# Patient Record
Sex: FEMALE | Race: WHITE | NOT HISPANIC OR LATINO | Employment: FULL TIME | URBAN - NONMETROPOLITAN AREA
[De-identification: names, ages, dates, MRNs, and addresses within clinical notes are randomized per-mention and may not be internally consistent; named-entity substitution may affect disease eponyms.]

---

## 2023-07-31 NOTE — PROGRESS NOTES
Subjective   Patient ID: Nighat Lam is a 33 y.o. female who presents for Establish Care (New pt; lab results were done recent; was told her thyroid was high and would like to discuss; was told she had anemia as well; ).    HPI     She had blood work done recently and wanted to go over it. Had this done at [facility]. She was told that she has elevated thyroid and she is anemic. She has been told that her iron has been low in the past when going to donate blood.   TSH was 7 on recent blood work.     Depression/Anxiety: Latuda 20mg daily. She is seeing Dr. Hurley for psych. Has been on this for 2-3 months. Feeling a little less irritable on the medication.     All other systems reviewed and negative for complaint unless stated above.    PMH: Depression, Bipolar 2 unsure. Mood swings.   Surgery: none   Family: Grandma with colon and lung cancer cancer, half sister with dm.   smoker: none   Etoh: occasional, very rare one drink in the past year  Drug use: none     PAP:  Does not seen anyone. She has been to family planning once, went about 1-2 years ago. She had one at 20 years old.       Review of Systems   Constitutional:  Negative for chills and fever.   HENT:  Negative for congestion, ear pain and rhinorrhea.    Eyes:  Negative for discharge and redness.   Respiratory:  Negative for cough, shortness of breath and wheezing.    Cardiovascular:  Negative for chest pain and leg swelling.   Gastrointestinal:  Negative for abdominal pain, constipation, diarrhea, nausea and vomiting.   Genitourinary:  Negative for difficulty urinating, frequency and urgency.   Musculoskeletal:  Negative for gait problem.   Skin:  Negative for rash and wound.   Neurological:  Negative for dizziness, weakness and headaches.   Psychiatric/Behavioral:  Negative for confusion. The patient is not nervous/anxious.        Objective   /82 (BP Location: Right arm, Patient Position: Sitting, BP Cuff Size: Large adult)   Pulse 58   Ht 1.524  m (5')   Wt 75.4 kg (166 lb 3.2 oz)   BMI 32.46 kg/m²     Physical Exam  Vitals reviewed.   Constitutional:       Appearance: Normal appearance.   HENT:      Head: Normocephalic.      Right Ear: Tympanic membrane, ear canal and external ear normal.      Left Ear: Tympanic membrane, ear canal and external ear normal.      Nose: No rhinorrhea.      Mouth/Throat:      Mouth: Mucous membranes are moist.      Pharynx: Oropharynx is clear.   Eyes:      Pupils: Pupils are equal, round, and reactive to light.   Cardiovascular:      Rate and Rhythm: Normal rate and regular rhythm.      Pulses: Normal pulses.   Pulmonary:      Effort: Pulmonary effort is normal.      Breath sounds: Normal breath sounds.   Abdominal:      General: Abdomen is flat. Bowel sounds are normal.      Palpations: Abdomen is soft.   Musculoskeletal:         General: No tenderness. Normal range of motion.      Right lower leg: No edema.      Left lower leg: No edema.   Lymphadenopathy:      Cervical: No cervical adenopathy.   Skin:     General: Skin is warm and dry.      Findings: No rash.   Neurological:      Mental Status: She is alert and oriented to person, place, and time.   Psychiatric:         Mood and Affect: Mood normal.         Behavior: Behavior normal.       Assessment/Plan          #Elevated tsh   Rechecking level   May need levothyroxine  Will call when results are back     #Anemia   Checking iron   She started an iron supplement     #HCM  Reviewed cholesterol and wnl

## 2023-08-02 ENCOUNTER — OFFICE VISIT (OUTPATIENT)
Dept: PRIMARY CARE | Facility: CLINIC | Age: 33
End: 2023-08-02
Payer: COMMERCIAL

## 2023-08-02 VITALS
HEART RATE: 58 BPM | WEIGHT: 166.2 LBS | DIASTOLIC BLOOD PRESSURE: 82 MMHG | HEIGHT: 60 IN | BODY MASS INDEX: 32.63 KG/M2 | SYSTOLIC BLOOD PRESSURE: 130 MMHG

## 2023-08-02 DIAGNOSIS — R79.89 ELEVATED TSH: ICD-10-CM

## 2023-08-02 DIAGNOSIS — Z00.00 ANNUAL PHYSICAL EXAM: Primary | ICD-10-CM

## 2023-08-02 DIAGNOSIS — D64.9 ANEMIA, UNSPECIFIED TYPE: ICD-10-CM

## 2023-08-02 PROCEDURE — 1036F TOBACCO NON-USER: CPT | Performed by: REGISTERED NURSE

## 2023-08-02 PROCEDURE — 99204 OFFICE O/P NEW MOD 45 MIN: CPT | Performed by: REGISTERED NURSE

## 2023-08-02 RX ORDER — LURASIDONE HYDROCHLORIDE 20 MG/1
TABLET, FILM COATED ORAL
COMMUNITY
Start: 2023-06-20 | End: 2023-10-02 | Stop reason: ALTCHOICE

## 2023-08-02 ASSESSMENT — ENCOUNTER SYMPTOMS
DIARRHEA: 0
CONSTIPATION: 0
RHINORRHEA: 0
SHORTNESS OF BREATH: 0
DIFFICULTY URINATING: 0
ABDOMINAL PAIN: 0
COUGH: 0
CHILLS: 0
NERVOUS/ANXIOUS: 0
FEVER: 0
WHEEZING: 0
HEADACHES: 0
EYE REDNESS: 0
VOMITING: 0
NAUSEA: 0
FREQUENCY: 0
CONFUSION: 0
EYE DISCHARGE: 0
DIZZINESS: 0
WEAKNESS: 0
WOUND: 0

## 2023-08-09 ENCOUNTER — LAB (OUTPATIENT)
Dept: LAB | Facility: LAB | Age: 33
End: 2023-08-09
Payer: COMMERCIAL

## 2023-08-09 DIAGNOSIS — D64.9 ANEMIA, UNSPECIFIED TYPE: ICD-10-CM

## 2023-08-09 LAB
BASOPHILS (10*3/UL) IN BLOOD BY AUTOMATED COUNT: 0.03 X10E9/L (ref 0–0.1)
BASOPHILS/100 LEUKOCYTES IN BLOOD BY AUTOMATED COUNT: 0.3 % (ref 0–2)
EOSINOPHILS (10*3/UL) IN BLOOD BY AUTOMATED COUNT: 0.16 X10E9/L (ref 0–0.7)
EOSINOPHILS/100 LEUKOCYTES IN BLOOD BY AUTOMATED COUNT: 1.8 % (ref 0–6)
ERYTHROCYTE DISTRIBUTION WIDTH (RATIO) BY AUTOMATED COUNT: 15.9 % (ref 11.5–14.5)
ERYTHROCYTE MEAN CORPUSCULAR HEMOGLOBIN CONCENTRATION (G/DL) BY AUTOMATED: 30.2 G/DL (ref 32–36)
ERYTHROCYTE MEAN CORPUSCULAR VOLUME (FL) BY AUTOMATED COUNT: 79 FL (ref 80–100)
ERYTHROCYTES (10*6/UL) IN BLOOD BY AUTOMATED COUNT: 4.19 X10E12/L (ref 4–5.2)
FERRITIN (UG/LL) IN SER/PLAS: 9 UG/L (ref 8–150)
HEMATOCRIT (%) IN BLOOD BY AUTOMATED COUNT: 33.1 % (ref 36–46)
HEMOGLOBIN (G/DL) IN BLOOD: 10 G/DL (ref 12–16)
HYPOCHROMIA (PRESENCE) IN BLOOD BY LIGHT MICROSCOPY: NORMAL
IMMATURE GRANULOCYTES/100 LEUKOCYTES IN BLOOD BY AUTOMATED COUNT: 0.3 % (ref 0–0.9)
IRON (UG/DL) IN SER/PLAS: 13 UG/DL (ref 35–150)
IRON BINDING CAPACITY (UG/DL) IN SER/PLAS: 410 UG/DL (ref 240–445)
IRON SATURATION (%) IN SER/PLAS: 3 % (ref 25–45)
LEUKOCYTES (10*3/UL) IN BLOOD BY AUTOMATED COUNT: 8.8 X10E9/L (ref 4.4–11.3)
LYMPHOCYTES (10*3/UL) IN BLOOD BY AUTOMATED COUNT: 3.22 X10E9/L (ref 1.2–4.8)
LYMPHOCYTES/100 LEUKOCYTES IN BLOOD BY AUTOMATED COUNT: 36.8 % (ref 13–44)
MONOCYTES (10*3/UL) IN BLOOD BY AUTOMATED COUNT: 0.62 X10E9/L (ref 0.1–1)
MONOCYTES/100 LEUKOCYTES IN BLOOD BY AUTOMATED COUNT: 7.1 % (ref 2–10)
NEUTROPHILS (10*3/UL) IN BLOOD BY AUTOMATED COUNT: 4.69 X10E9/L (ref 1.2–7.7)
NEUTROPHILS/100 LEUKOCYTES IN BLOOD BY AUTOMATED COUNT: 53.7 % (ref 40–80)
OVALOCYTES PRESENCE IN BLOOD BY LIGHT MICROSCOPY: NORMAL
PLATELETS (10*3/UL) IN BLOOD AUTOMATED COUNT: 225 X10E9/L (ref 150–450)
RBC MORPHOLOGY IN BLOOD: NORMAL
THYROTROPIN (MIU/L) IN SER/PLAS BY DETECTION LIMIT <= 0.05 MIU/L: 4.13 MIU/L (ref 0.44–3.98)
THYROXINE (T4) FREE (NG/DL) IN SER/PLAS: 0.87 NG/DL (ref 0.61–1.12)

## 2023-08-09 PROCEDURE — 83550 IRON BINDING TEST: CPT

## 2023-08-09 PROCEDURE — 84443 ASSAY THYROID STIM HORMONE: CPT

## 2023-08-09 PROCEDURE — 83540 ASSAY OF IRON: CPT

## 2023-08-09 PROCEDURE — 36415 COLL VENOUS BLD VENIPUNCTURE: CPT

## 2023-08-09 PROCEDURE — 82728 ASSAY OF FERRITIN: CPT

## 2023-08-09 PROCEDURE — 82607 VITAMIN B-12: CPT

## 2023-08-09 PROCEDURE — 85025 COMPLETE CBC W/AUTO DIFF WBC: CPT

## 2023-08-09 PROCEDURE — 84439 ASSAY OF FREE THYROXINE: CPT

## 2023-08-10 LAB — COBALAMIN (VITAMIN B12) (PG/ML) IN SER/PLAS: 320 PG/ML (ref 211–911)

## 2023-08-15 DIAGNOSIS — E03.9 HYPOTHYROIDISM, UNSPECIFIED TYPE: Primary | ICD-10-CM

## 2023-08-16 RX ORDER — LEVOTHYROXINE SODIUM 25 UG/1
25 TABLET ORAL DAILY
Qty: 30 TABLET | Refills: 11 | Status: SHIPPED | OUTPATIENT
Start: 2023-08-16 | End: 2023-10-11 | Stop reason: SDUPTHER

## 2023-09-27 RX ORDER — BUSPIRONE HYDROCHLORIDE 10 MG/1
TABLET ORAL
COMMUNITY
Start: 2023-08-03 | End: 2023-10-02 | Stop reason: ALTCHOICE

## 2023-09-27 RX ORDER — HYDROXYZINE HYDROCHLORIDE 10 MG/1
TABLET, FILM COATED ORAL
COMMUNITY
Start: 2023-08-03 | End: 2024-02-05 | Stop reason: WASHOUT

## 2023-09-27 NOTE — PROGRESS NOTES
Subjective   Patient ID: Nighat Lam is a 33 y.o. female who presents for Follow-up (She is having an issue with increased anxiety. ).    HPI     She had blood work done recently and wanted to go over it. Had this done at [facility]. She was told that she has elevated thyroid and she is anemic. She has been told that her iron has been low in the past when going to donate blood. TSH was 7 on recent blood work.   She was rechecked and her TSH was 4.13, she was started on 25mcg of levothyroxine, due for recheck .      Depression/Anxiety: Latuda 20mg daily. She is seeing Dr. Hurley for psych. Has been on this for 2-3 months. Feeling a little less irritable on the medication.    She was recently increase to 40mg on her Latuda and this is helping a lot with her depression but she states since the change her anxiety has increased significantly. She is visibly shaking in office. She has Hydroxyzine 10mg for as needed use. Tried Buspirone before and had issues with this.     All other systems reviewed and negative for complaint unless stated above.     PMH: Depression, Bipolar 2 unsure. Mood swings.   Surgery: none   Family: Grandma with colon and lung cancer cancer, half sister with dm.   smoker: none   Etoh: occasional, very rare one drink in the past year  Drug use: none      PAP:  Does not seen anyone. She has been to family planning once, went about 1-2 years ago. She had one at 20 years old.       Review of Systems   Constitutional:  Negative for chills and fever.   HENT:  Negative for congestion, ear pain and rhinorrhea.    Eyes:  Negative for discharge and redness.   Respiratory:  Negative for cough, shortness of breath and wheezing.    Cardiovascular:  Negative for chest pain and leg swelling.   Gastrointestinal:  Negative for abdominal pain, constipation, diarrhea, nausea and vomiting.   Genitourinary:  Negative for difficulty urinating, frequency and urgency.   Musculoskeletal:  Negative for gait problem.    Skin:  Negative for rash and wound.   Neurological:  Negative for dizziness, weakness and headaches.   Psychiatric/Behavioral:  Negative for confusion. The patient is not nervous/anxious.        Objective   /82 (BP Location: Right arm)   Pulse 75   Ht 1.524 m (5')   Wt 75.9 kg (167 lb 6.4 oz)   BMI 32.69 kg/m²     Physical Exam  Constitutional:       Appearance: Normal appearance.   Cardiovascular:      Rate and Rhythm: Normal rate and regular rhythm.   Pulmonary:      Effort: Pulmonary effort is normal.      Breath sounds: Normal breath sounds.   Skin:     General: Skin is warm and dry.   Neurological:      Mental Status: She is alert and oriented to person, place, and time. Mental status is at baseline.   Psychiatric:         Mood and Affect: Mood normal.         Behavior: Behavior normal.         Assessment/Plan       #Elevated tsh   Started levothyroxine 25mcg   Rechecking tsh      #Anemia   Checking iron   She started an iron supplement     #Anxiety/Depression   Seeing psych, Dr. Hurley, has follow up 10/11  Increased hydroxyzine to 25mg TID   Continue latuda 40mg   She does not want to cut back at this time  Will discuss with Psych      #HCM  Reviewed cholesterol and wnl

## 2023-10-02 ENCOUNTER — OFFICE VISIT (OUTPATIENT)
Dept: PRIMARY CARE | Facility: CLINIC | Age: 33
End: 2023-10-02
Payer: COMMERCIAL

## 2023-10-02 VITALS
DIASTOLIC BLOOD PRESSURE: 82 MMHG | WEIGHT: 167.4 LBS | BODY MASS INDEX: 32.86 KG/M2 | HEIGHT: 60 IN | SYSTOLIC BLOOD PRESSURE: 122 MMHG | HEART RATE: 75 BPM

## 2023-10-02 DIAGNOSIS — F41.9 ANXIETY AND DEPRESSION: ICD-10-CM

## 2023-10-02 DIAGNOSIS — E03.9 ACQUIRED HYPOTHYROIDISM: Primary | ICD-10-CM

## 2023-10-02 DIAGNOSIS — F32.A ANXIETY AND DEPRESSION: ICD-10-CM

## 2023-10-02 PROCEDURE — 99213 OFFICE O/P EST LOW 20 MIN: CPT | Performed by: REGISTERED NURSE

## 2023-10-02 PROCEDURE — 1036F TOBACCO NON-USER: CPT | Performed by: REGISTERED NURSE

## 2023-10-02 RX ORDER — HYDROXYZINE HYDROCHLORIDE 25 MG/1
25 TABLET, FILM COATED ORAL 3 TIMES DAILY
Qty: 42 TABLET | Refills: 0 | Status: SHIPPED | OUTPATIENT
Start: 2023-10-02 | End: 2023-10-16

## 2023-10-02 RX ORDER — LURASIDONE HYDROCHLORIDE 40 MG/1
TABLET, FILM COATED ORAL
COMMUNITY
Start: 2023-08-31

## 2023-10-02 ASSESSMENT — ENCOUNTER SYMPTOMS
EYE DISCHARGE: 0
EYE REDNESS: 0
CONSTIPATION: 0
RHINORRHEA: 0
FREQUENCY: 0
NERVOUS/ANXIOUS: 0
WOUND: 0
CHILLS: 0
SHORTNESS OF BREATH: 0
WEAKNESS: 0
COUGH: 0
NAUSEA: 0
DIZZINESS: 0
VOMITING: 0
WHEEZING: 0
DIARRHEA: 0
DIFFICULTY URINATING: 0
HEADACHES: 0
ABDOMINAL PAIN: 0
FEVER: 0
CONFUSION: 0

## 2023-10-10 ENCOUNTER — LAB (OUTPATIENT)
Dept: LAB | Facility: LAB | Age: 33
End: 2023-10-10
Payer: COMMERCIAL

## 2023-10-10 DIAGNOSIS — E03.9 ACQUIRED HYPOTHYROIDISM: ICD-10-CM

## 2023-10-10 LAB
T4 FREE SERPL-MCNC: 0.86 NG/DL (ref 0.61–1.12)
TSH SERPL-ACNC: 5.67 MIU/L (ref 0.44–3.98)

## 2023-10-10 PROCEDURE — 36415 COLL VENOUS BLD VENIPUNCTURE: CPT

## 2023-10-10 PROCEDURE — 84439 ASSAY OF FREE THYROXINE: CPT

## 2023-10-10 PROCEDURE — 84443 ASSAY THYROID STIM HORMONE: CPT

## 2023-10-11 DIAGNOSIS — E03.9 HYPOTHYROIDISM, UNSPECIFIED TYPE: ICD-10-CM

## 2023-10-11 RX ORDER — LEVOTHYROXINE SODIUM 50 UG/1
50 TABLET ORAL DAILY
Qty: 30 TABLET | Refills: 11 | Status: SHIPPED | OUTPATIENT
Start: 2023-10-11 | End: 2024-10-10

## 2024-02-05 ENCOUNTER — OFFICE VISIT (OUTPATIENT)
Dept: PRIMARY CARE | Facility: CLINIC | Age: 34
End: 2024-02-05
Payer: COMMERCIAL

## 2024-02-05 VITALS
WEIGHT: 168.8 LBS | HEIGHT: 60 IN | DIASTOLIC BLOOD PRESSURE: 76 MMHG | SYSTOLIC BLOOD PRESSURE: 111 MMHG | BODY MASS INDEX: 33.14 KG/M2 | HEART RATE: 76 BPM

## 2024-02-05 DIAGNOSIS — E03.9 ACQUIRED HYPOTHYROIDISM: Primary | ICD-10-CM

## 2024-02-05 PROCEDURE — 1036F TOBACCO NON-USER: CPT | Performed by: REGISTERED NURSE

## 2024-02-05 PROCEDURE — 99213 OFFICE O/P EST LOW 20 MIN: CPT | Performed by: REGISTERED NURSE

## 2024-02-05 RX ORDER — ESCITALOPRAM OXALATE 10 MG/1
10 TABLET ORAL DAILY
COMMUNITY
Start: 2024-01-27

## 2024-02-05 ASSESSMENT — ENCOUNTER SYMPTOMS
EYE DISCHARGE: 0
CONSTIPATION: 0
ABDOMINAL PAIN: 0
NAUSEA: 0
VOMITING: 0
RHINORRHEA: 0
DIZZINESS: 0
EYE REDNESS: 0
CHILLS: 0
DIARRHEA: 0
NERVOUS/ANXIOUS: 0
SHORTNESS OF BREATH: 0
WEAKNESS: 0
DIFFICULTY URINATING: 0
WHEEZING: 0
FREQUENCY: 0
WOUND: 0
HEADACHES: 0
CONFUSION: 0
FEVER: 0
COUGH: 0

## 2024-02-05 NOTE — PROGRESS NOTES
Subjective   Patient ID: Nighat Lam is a 33 y.o. female who presents for Follow-up (Would like to get blood work to get her thyroid checked. ).    HPI   She had blood work done recently and wanted to go over it. Had this done at [facility]. She was told that she has elevated thyroid and she is anemic. She has been told that her iron has been low in the past when going to donate blood. TSH was 7 on recent blood work. She works 3rd shift.      Depression/Anxiety: Latuda 20mg daily. She is seeing Dr. Hurley for psych. Has been on this for 2-3 months. Feeling a little less irritable on the medication.    She was recently increase to 40mg on her Latuda and this is helping a lot with her depression but she states since the change her anxiety has increased significantly. She is visibly shaking in office. She has Hydroxyzine 10mg for as needed use. Tried Buspirone before and had issues with this.      All other systems reviewed and negative for complaint unless stated above.     PMH: Depression, Bipolar 2 unsure. Mood swings.   Surgery: none   Family: Grandma with colon and lung cancer cancer, half sister with dm.   smoker: none   Etoh: occasional, very rare one drink in the past year  Drug use: none      PAP:  Does not seen anyone. She has been to family planning once, went about 1-2 years ago. She had one at 20 years old.     Review of Systems   Constitutional:  Negative for chills and fever.   HENT:  Negative for congestion, ear pain and rhinorrhea.    Eyes:  Negative for discharge and redness.   Respiratory:  Negative for cough, shortness of breath and wheezing.    Cardiovascular:  Negative for chest pain and leg swelling.   Gastrointestinal:  Negative for abdominal pain, constipation, diarrhea, nausea and vomiting.   Genitourinary:  Negative for difficulty urinating, frequency and urgency.   Musculoskeletal:  Negative for gait problem.   Skin:  Negative for rash and wound.   Neurological:  Negative for dizziness,  weakness and headaches.   Psychiatric/Behavioral:  Negative for confusion. The patient is not nervous/anxious.        Objective   /76 (BP Location: Right arm)   Pulse 76   Ht 1.524 m (5')   Wt 76.6 kg (168 lb 12.8 oz)   BMI 32.97 kg/m²     Physical Exam  Constitutional:       Appearance: Normal appearance.   Cardiovascular:      Rate and Rhythm: Normal rate and regular rhythm.   Pulmonary:      Effort: Pulmonary effort is normal.      Breath sounds: Normal breath sounds.   Skin:     General: Skin is warm and dry.   Neurological:      Mental Status: She is alert and oriented to person, place, and time. Mental status is at baseline.   Psychiatric:         Mood and Affect: Mood normal.         Behavior: Behavior normal.       Assessment/Plan          #Elevated tsh   Continue Levothyroxine 50mcg   Rechecking tsh      #Anemia   Checking iron   She started an iron supplement      #Anxiety/Depression   Seeing psych, Dr. Hurley, has follow up 10/11  Increased hydroxyzine to 25mg TID   Continue latuda 40mg   She does not want to cut back at this time  Started on lexapro this is helping.   Will discuss with Psych      #HCM  Reviewed cholesterol and wnl

## 2025-04-21 ENCOUNTER — OFFICE VISIT (OUTPATIENT)
Dept: URGENT CARE | Age: 35
End: 2025-04-21
Payer: COMMERCIAL

## 2025-04-21 DIAGNOSIS — R19.7 DIARRHEA, UNSPECIFIED TYPE: Primary | ICD-10-CM

## 2025-04-21 PROCEDURE — 99202 OFFICE O/P NEW SF 15 MIN: CPT | Performed by: NURSE PRACTITIONER

## 2025-04-21 NOTE — PROGRESS NOTES
Urgent Care Virtual Video Visit    Patient Location: Home  Provider Location: [facility]    I have communicated my name and active licensure. Video visit completed with realtime synchronous video/audio connection. Informed consent was obtained from the patient. Patient was made aware that my evaluation and diagnosis are limited due to the fact that we are not in the same room during the interview and that this is a virtual encounter that took place via videoconferencing. Patient verbalized understanding.     Patient complaints of diarrhea that started at 3:00 AM. C/o umbilical pain and cramping. Complain of nausea with no vomiting. I did suggest that she go to the closest clinic for a full assessment and she declined.  States that she only needs a work note since she has diarrhea. I did do extensive dehydration teaching with her and I will provide her a work note through her e-mail. Again, she did decline to go into the closest urgent care and states if her symptoms do not improve she will go to the ER.     Patient disposition: Home    Electronically signed by ISRAEL Coulter  11:26 AM

## 2025-04-21 NOTE — LETTER
April 21, 2025     Patient: Nighat Lam   YOB: 1990   Date of Visit: 4/21/2025       To Whom It May Concern:    Nighat Lam was seen in my clinic on 4/21/2025 at 11:20 am. Please excuse Nighat for her absence from work on this day to make the appointment.    If you have any questions or concerns, please don't hesitate to call.         Sincerely,         HERSON Coulter-CNP        CC: No Recipients

## 2025-05-29 ENCOUNTER — APPOINTMENT (OUTPATIENT)
Dept: RADIOLOGY | Facility: HOSPITAL | Age: 35
End: 2025-05-29
Payer: COMMERCIAL

## 2025-05-29 ENCOUNTER — HOSPITAL ENCOUNTER (EMERGENCY)
Facility: HOSPITAL | Age: 35
Discharge: HOME | End: 2025-05-29
Attending: EMERGENCY MEDICINE
Payer: COMMERCIAL

## 2025-05-29 VITALS
DIASTOLIC BLOOD PRESSURE: 82 MMHG | OXYGEN SATURATION: 100 % | HEART RATE: 77 BPM | RESPIRATION RATE: 16 BRPM | BODY MASS INDEX: 32.39 KG/M2 | SYSTOLIC BLOOD PRESSURE: 120 MMHG | HEIGHT: 60 IN | WEIGHT: 165 LBS | TEMPERATURE: 98.2 F

## 2025-05-29 DIAGNOSIS — N83.201 CYST OF RIGHT OVARY: ICD-10-CM

## 2025-05-29 DIAGNOSIS — R03.0 ELEVATED BLOOD PRESSURE READING: ICD-10-CM

## 2025-05-29 DIAGNOSIS — R10.84 ABDOMINAL PAIN, GENERALIZED: Primary | ICD-10-CM

## 2025-05-29 LAB
ALBUMIN SERPL BCP-MCNC: 4.2 G/DL (ref 3.4–5)
ALP SERPL-CCNC: 45 U/L (ref 33–110)
ALT SERPL W P-5'-P-CCNC: 14 U/L (ref 7–45)
ANION GAP SERPL CALC-SCNC: 9 MMOL/L (ref 10–20)
APPEARANCE UR: CLEAR
AST SERPL W P-5'-P-CCNC: 14 U/L (ref 9–39)
B-HCG SERPL-ACNC: <2 MIU/ML
BASOPHILS # BLD AUTO: 0.03 X10*3/UL (ref 0–0.1)
BASOPHILS NFR BLD AUTO: 0.4 %
BILIRUB SERPL-MCNC: 0.2 MG/DL (ref 0–1.2)
BILIRUB UR STRIP.AUTO-MCNC: NEGATIVE MG/DL
BUN SERPL-MCNC: 9 MG/DL (ref 6–23)
CALCIUM SERPL-MCNC: 8.6 MG/DL (ref 8.6–10.3)
CHLORIDE SERPL-SCNC: 109 MMOL/L (ref 98–107)
CO2 SERPL-SCNC: 24 MMOL/L (ref 21–32)
COLOR UR: ABNORMAL
CREAT SERPL-MCNC: 0.55 MG/DL (ref 0.5–1.05)
EGFRCR SERPLBLD CKD-EPI 2021: >90 ML/MIN/1.73M*2
EOSINOPHIL # BLD AUTO: 0.25 X10*3/UL (ref 0–0.7)
EOSINOPHIL NFR BLD AUTO: 3.5 %
ERYTHROCYTE [DISTWIDTH] IN BLOOD BY AUTOMATED COUNT: 15.9 % (ref 11.5–14.5)
GLUCOSE SERPL-MCNC: 93 MG/DL (ref 74–99)
GLUCOSE UR STRIP.AUTO-MCNC: NEGATIVE MG/DL
HCT VFR BLD AUTO: 32.4 % (ref 36–46)
HGB BLD-MCNC: 10.2 G/DL (ref 12–16)
HOLD SPECIMEN: 293
HOLD SPECIMEN: 293
HOLD SPECIMEN: NORMAL
HOLD SPECIMEN: NORMAL
IMM GRANULOCYTES # BLD AUTO: 0.01 X10*3/UL (ref 0–0.7)
IMM GRANULOCYTES NFR BLD AUTO: 0.1 % (ref 0–0.9)
KETONES UR STRIP.AUTO-MCNC: NEGATIVE MG/DL
LEUKOCYTE ESTERASE UR QL STRIP.AUTO: ABNORMAL
LIPASE SERPL-CCNC: 33 U/L (ref 9–82)
LYMPHOCYTES # BLD AUTO: 3.04 X10*3/UL (ref 1.2–4.8)
LYMPHOCYTES NFR BLD AUTO: 42.9 %
MCH RBC QN AUTO: 25.1 PG (ref 26–34)
MCHC RBC AUTO-ENTMCNC: 31.5 G/DL (ref 32–36)
MCV RBC AUTO: 80 FL (ref 80–100)
MONOCYTES # BLD AUTO: 0.62 X10*3/UL (ref 0.1–1)
MONOCYTES NFR BLD AUTO: 8.7 %
NEUTROPHILS # BLD AUTO: 3.14 X10*3/UL (ref 1.2–7.7)
NEUTROPHILS NFR BLD AUTO: 44.4 %
NITRITE UR QL STRIP.AUTO: NEGATIVE
NRBC BLD-RTO: 0 /100 WBCS (ref 0–0)
PH UR STRIP.AUTO: 6 [PH]
PLATELET # BLD AUTO: 260 X10*3/UL (ref 150–450)
POTASSIUM SERPL-SCNC: 3.8 MMOL/L (ref 3.5–5.3)
PROT SERPL-MCNC: 6.7 G/DL (ref 6.4–8.2)
PROT UR STRIP.AUTO-MCNC: NEGATIVE MG/DL
RBC # BLD AUTO: 4.07 X10*6/UL (ref 4–5.2)
RBC # UR STRIP.AUTO: ABNORMAL MG/DL
RBC #/AREA URNS AUTO: ABNORMAL /HPF
SODIUM SERPL-SCNC: 138 MMOL/L (ref 136–145)
SP GR UR STRIP.AUTO: 1
SQUAMOUS #/AREA URNS AUTO: ABNORMAL /HPF
UROBILINOGEN UR STRIP.AUTO-MCNC: <2 MG/DL
WBC # BLD AUTO: 7.1 X10*3/UL (ref 4.4–11.3)
WBC #/AREA URNS AUTO: ABNORMAL /HPF

## 2025-05-29 PROCEDURE — 96374 THER/PROPH/DIAG INJ IV PUSH: CPT

## 2025-05-29 PROCEDURE — 76830 TRANSVAGINAL US NON-OB: CPT | Mod: FOREIGN READ | Performed by: RADIOLOGY

## 2025-05-29 PROCEDURE — 74177 CT ABD & PELVIS W/CONTRAST: CPT | Performed by: RADIOLOGY

## 2025-05-29 PROCEDURE — 83690 ASSAY OF LIPASE: CPT | Performed by: EMERGENCY MEDICINE

## 2025-05-29 PROCEDURE — 99285 EMERGENCY DEPT VISIT HI MDM: CPT | Mod: 25 | Performed by: EMERGENCY MEDICINE

## 2025-05-29 PROCEDURE — 96375 TX/PRO/DX INJ NEW DRUG ADDON: CPT

## 2025-05-29 PROCEDURE — 85025 COMPLETE CBC W/AUTO DIFF WBC: CPT | Performed by: EMERGENCY MEDICINE

## 2025-05-29 PROCEDURE — 87086 URINE CULTURE/COLONY COUNT: CPT | Mod: CONLAB | Performed by: EMERGENCY MEDICINE

## 2025-05-29 PROCEDURE — 76856 US EXAM PELVIC COMPLETE: CPT | Mod: FOREIGN READ | Performed by: RADIOLOGY

## 2025-05-29 PROCEDURE — 80053 COMPREHEN METABOLIC PANEL: CPT | Performed by: EMERGENCY MEDICINE

## 2025-05-29 PROCEDURE — 36415 COLL VENOUS BLD VENIPUNCTURE: CPT | Performed by: EMERGENCY MEDICINE

## 2025-05-29 PROCEDURE — 2550000001 HC RX 255 CONTRASTS: Mod: JZ | Performed by: EMERGENCY MEDICINE

## 2025-05-29 PROCEDURE — 74177 CT ABD & PELVIS W/CONTRAST: CPT

## 2025-05-29 PROCEDURE — 76856 US EXAM PELVIC COMPLETE: CPT

## 2025-05-29 PROCEDURE — 84702 CHORIONIC GONADOTROPIN TEST: CPT | Performed by: EMERGENCY MEDICINE

## 2025-05-29 PROCEDURE — 2500000004 HC RX 250 GENERAL PHARMACY W/ HCPCS (ALT 636 FOR OP/ED): Mod: JZ

## 2025-05-29 PROCEDURE — 2500000004 HC RX 250 GENERAL PHARMACY W/ HCPCS (ALT 636 FOR OP/ED): Mod: JZ | Performed by: EMERGENCY MEDICINE

## 2025-05-29 PROCEDURE — 96361 HYDRATE IV INFUSION ADD-ON: CPT

## 2025-05-29 PROCEDURE — 81001 URINALYSIS AUTO W/SCOPE: CPT | Performed by: EMERGENCY MEDICINE

## 2025-05-29 RX ORDER — KETOROLAC TROMETHAMINE 10 MG/1
10 TABLET, FILM COATED ORAL EVERY 6 HOURS PRN
Qty: 20 TABLET | Refills: 0 | Status: SHIPPED | OUTPATIENT
Start: 2025-05-29 | End: 2025-06-03

## 2025-05-29 RX ORDER — MORPHINE SULFATE 4 MG/ML
4 INJECTION INTRAVENOUS ONCE
Status: COMPLETED | OUTPATIENT
Start: 2025-05-29 | End: 2025-05-29

## 2025-05-29 RX ORDER — ONDANSETRON HYDROCHLORIDE 2 MG/ML
4 INJECTION, SOLUTION INTRAVENOUS ONCE
Status: COMPLETED | OUTPATIENT
Start: 2025-05-29 | End: 2025-05-29

## 2025-05-29 RX ORDER — KETOROLAC TROMETHAMINE 15 MG/ML
15 INJECTION, SOLUTION INTRAMUSCULAR; INTRAVENOUS ONCE
Status: COMPLETED | OUTPATIENT
Start: 2025-05-29 | End: 2025-05-29

## 2025-05-29 RX ORDER — TRAMADOL HYDROCHLORIDE 50 MG/1
50 TABLET, FILM COATED ORAL EVERY 6 HOURS PRN
Qty: 12 TABLET | Refills: 0 | Status: SHIPPED | OUTPATIENT
Start: 2025-05-29 | End: 2025-06-01

## 2025-05-29 RX ORDER — KETOROLAC TROMETHAMINE 15 MG/ML
INJECTION, SOLUTION INTRAMUSCULAR; INTRAVENOUS
Status: COMPLETED
Start: 2025-05-29 | End: 2025-05-29

## 2025-05-29 RX ADMIN — MORPHINE SULFATE 4 MG: 4 INJECTION, SOLUTION INTRAMUSCULAR; INTRAVENOUS at 04:30

## 2025-05-29 RX ADMIN — KETOROLAC TROMETHAMINE 15 MG: 15 INJECTION, SOLUTION INTRAMUSCULAR; INTRAVENOUS at 10:09

## 2025-05-29 RX ADMIN — SODIUM CHLORIDE 1000 ML: 0.9 INJECTION, SOLUTION INTRAVENOUS at 04:30

## 2025-05-29 RX ADMIN — ONDANSETRON 4 MG: 2 INJECTION INTRAMUSCULAR; INTRAVENOUS at 04:31

## 2025-05-29 RX ADMIN — IOHEXOL 75 ML: 350 INJECTION, SOLUTION INTRAVENOUS at 06:16

## 2025-05-29 ASSESSMENT — PAIN - FUNCTIONAL ASSESSMENT: PAIN_FUNCTIONAL_ASSESSMENT: 0-10

## 2025-05-29 ASSESSMENT — PAIN SCALES - GENERAL
PAINLEVEL_OUTOF10: 6
PAINLEVEL_OUTOF10: 9
PAINLEVEL_OUTOF10: 2
PAINLEVEL_OUTOF10: 3

## 2025-05-29 ASSESSMENT — PAIN DESCRIPTION - DESCRIPTORS: DESCRIPTORS: SHARP

## 2025-05-29 ASSESSMENT — COLUMBIA-SUICIDE SEVERITY RATING SCALE - C-SSRS
2. HAVE YOU ACTUALLY HAD ANY THOUGHTS OF KILLING YOURSELF?: NO
6. HAVE YOU EVER DONE ANYTHING, STARTED TO DO ANYTHING, OR PREPARED TO DO ANYTHING TO END YOUR LIFE?: NO
1. IN THE PAST MONTH, HAVE YOU WISHED YOU WERE DEAD OR WISHED YOU COULD GO TO SLEEP AND NOT WAKE UP?: NO

## 2025-05-29 ASSESSMENT — PAIN DESCRIPTION - PAIN TYPE: TYPE: ACUTE PAIN

## 2025-05-29 ASSESSMENT — PAIN DESCRIPTION - LOCATION: LOCATION: ABDOMEN

## 2025-05-29 NOTE — PROGRESS NOTES
Emergency Medicine Transition of Care Note.    I received Nighat Lam in signout from Dr. WILLIAN Barajas.  Please see the previous ED provider note for all HPI, PE and MDM up to the time of signout at 0800. This is in addition to the primary record.    In brief Nighat Lam is an 35 y.o. female presenting for   Chief Complaint   Patient presents with    Abdominal Pain     At the time of signout we were awaiting: Pelvic US    Patient presents emergency department the above history and physical.  Differential diagnosis includes appendicitis, renal bowel obstruction, ovarian cyst, ovarian torsion, pregnancy related complication, biliary colic, pancreatitis, acid peptic disease, gastroenteritis, dysmenorrhea, functional abdominal pain.     Patient treated with IV fluids, IV Zofran, IV morphine with good symptom improvement.  Laboratory evaluation includes negative pregnancy chemistry panel remarkable for chloride of 109 lipase is normal.  No peripheral leukocytosis.  Hemoglobin 10.2.  Urinalysis shows specific gravity of 1.004.  Large blood, negative nitrate, trace leukocyte esterase only 1-5 white cells and 11-20 red cells.  This may be on the basis of microscopic hematuria or contamination from the patient's menstrual bleeding.     CT scan of abdomen pelvis was obtained and read by radiology -- No bowel obstruction or bowel wall thickening, normal appendix, no aneurysmal dilatation of the abdominal aorta no hydronephrosis, bladder not distended, bile ducts nondilated, uterus unremarkable, 4.8 cm ill-defined mass on the right pelvic sidewall presumably related to adnexa ultrasound recommended.     Results presented to the patient and her significant other who was present at the bedside.  Pelvic ultrasound has been ordered.       0800 --Case will be signed out to Dr Barnhart, the [facility] physician to follow-up on test results and make final disposition.         ED Course as of 05/29/25 1002   u May 29,  2025 0649 Patient reassessed, results reviewed with patient [MN]   0831 CT Abdomen/Pelvis:  REPRODUCTIVE ORGANS:  Uterus unremarkable. Ill-defined 4.8 cm mass along the right pelvic  sidewall presumably related to the adnexa.      ABDOMINAL WALL:  Unremarkable.      BONE AND SOFT TISSUE:  Bones are intact.      IMPRESSION:  1. Ill-defined 4.8 cm mass about the right pelvic sidewall likely  adnexal in origin. Ultrasound recommended for further evaluation.   [EC]   0907 Pelvic US:  IMPRESSION:  Hemorrhagic right ovarian cyst measuring 2.9 x 2.2 x 2.3 cm.  This is  almost certainly benign and follow up is recommended as clinically  indicated.  This finding correlates with CT findings.  Normal color and spectral Doppler flow within both ovaries.  No  torsion is appreciated bilaterally.  Small amount of fluid within the cul-de-sac.  Signed by Isael Jiménez MD   [EC]      ED Course User Index  [EC] Asad Barnhart DO  [MN] Anna Barajas MD         Diagnoses as of 05/29/25 1002   Abdominal pain, generalized   Elevated blood pressure reading   Cyst of right ovary       Medical Decision Making  Patient feeling somewhat better.  Toradol 15 mg IV x 1 given.    Pelvic ultrasound shows right hemorrhagic ovarian cyst.  I reviewed results with the patient.  Discharge home.  Follow-up with primary care and/or OB/GYN in 4 to 7 days.  Rx ketorolac 10 mg 1 p.o. every 6 hours as needed moderate pain.  #20 no refills  Rx tramadol 50 mg 1 p.o. every 6 hours as needed moderate-severe pain.  #12 no refills          Final diagnoses:   [R10.84] Abdominal pain, generalized   [R03.0] Elevated blood pressure reading   [N83.201] Cyst of right ovary           Procedure  Procedures    Asad Barnhart DO

## 2025-05-29 NOTE — ED NOTES
Patient states she awoke with 9/10 RLQ abdominal pain sharp and tender. Some N, no V. LMP started yesterday. No bowel or urinary symptoms     Geoff Frazier RN  05/29/25 3018

## 2025-05-29 NOTE — DISCHARGE INSTRUCTIONS
Labs ok.  Pelvic US shows right ovarian cyst (ruptured)  Take prescribed pain medications only as directed/needed.  RECHECK with your primary care and/or OB/GYN doctor (see referral given)

## 2025-05-29 NOTE — Clinical Note
Nighat Lam was seen and treated in our emergency department on 5/29/2025.  She may return to work on 06/01/2025.       If you have any questions or concerns, please don't hesitate to call.      Anna Barajas MD

## 2025-05-29 NOTE — ED PROVIDER NOTES
HPI   Chief Complaint   Patient presents with   • Abdominal Pain         History provided by:  Medical records and patient   used: No      This patient presents to the emergency department amatory via private vehicle for evaluation of abdominal pain.  Patient states that she has some pain yesterday but it got much worse tonight.  It waxes and wanes in intensity 8 to evaluate for today.  Associate with some nausea, but no vomiting.  No urinary symptoms.  No diarrhea.  She started her menses yesterday.  She states she has always very severe cramping periods, but this feels different.    Patient reports nursing staff pain was in the right lower quadrant.  She tells me it is periumbilical and more supraumbilical.  No back or flank pain.  She has not run any fever.  She denies any injury.  No history of abdominal surgery.  She said she was told she had an ovarian cyst years ago but never followed up on it.      Patient History   Medical History[1]  Surgical History[2]  Family History[3]  Social History[4]    Physical Exam   ED Triage Vitals [05/29/25 0425]   Temperature Heart Rate Respirations BP   36.4 °C (97.6 °F) 98 20 (!) 143/99      SpO2 Temp src Heart Rate Source Patient Position   97 % -- -- --      BP Location FiO2 (%)     -- --       Physical Exam  Vitals reviewed.   Constitutional:       Comments: Uncomfortable, nontoxic appearing   HENT:      Head: Normocephalic and atraumatic.   Eyes:      Extraocular Movements: Extraocular movements intact.      Pupils: Pupils are equal, round, and reactive to light.   Cardiovascular:      Rate and Rhythm: Normal rate and regular rhythm.   Pulmonary:      Effort: Pulmonary effort is normal.      Breath sounds: Normal breath sounds.   Abdominal:      General: Abdomen is flat. Bowel sounds are normal.      Palpations: Abdomen is soft.      Tenderness: There is abdominal tenderness in the epigastric area and periumbilical area. There is no guarding or  rebound. Negative signs include Oakes's sign and McBurney's sign.      Hernia: No hernia is present.   Skin:     General: Skin is warm and dry.      Capillary Refill: Capillary refill takes less than 2 seconds.      Coloration: Skin is not jaundiced.   Neurological:      General: No focal deficit present.      Mental Status: She is alert.   Psychiatric:         Mood and Affect: Mood normal.           ED Course & MDM   ED Course as of 05/29/25 0736   Thu May 29, 2025   0649 Patient reassessed, results reviewed with patient [MN]      ED Course User Index  [MN] Anna Barajas MD         Diagnoses as of 05/29/25 0736   Abdominal pain, generalized   Elevated blood pressure reading     Labs Reviewed   CBC WITH AUTO DIFFERENTIAL - Abnormal       Result Value    WBC 7.1      nRBC 0.0      RBC 4.07      Hemoglobin 10.2 (*)     Hematocrit 32.4 (*)     MCV 80      MCH 25.1 (*)     MCHC 31.5 (*)     RDW 15.9 (*)     Platelets 260      Neutrophils % 44.4      Immature Granulocytes %, Automated 0.1      Lymphocytes % 42.9      Monocytes % 8.7      Eosinophils % 3.5      Basophils % 0.4      Neutrophils Absolute 3.14      Immature Granulocytes Absolute, Automated 0.01      Lymphocytes Absolute 3.04      Monocytes Absolute 0.62      Eosinophils Absolute 0.25      Basophils Absolute 0.03     COMPREHENSIVE METABOLIC PANEL - Abnormal    Glucose 93      Sodium 138      Potassium 3.8      Chloride 109 (*)     Bicarbonate 24      Anion Gap 9 (*)     Urea Nitrogen 9      Creatinine 0.55      eGFR >90      Calcium 8.6      Albumin 4.2      Alkaline Phosphatase 45      Total Protein 6.7      AST 14      Bilirubin, Total 0.2      ALT 14     URINALYSIS WITH REFLEX CULTURE AND MICROSCOPIC - Abnormal    Color, Urine Straw      Appearance, Urine Clear      Specific Gravity, Urine 1.004 (*)     pH, Urine 6.0      Protein, Urine NEGATIVE      Glucose, Urine NEGATIVE      Blood, Urine LARGE (3+) (*)     Ketones, Urine NEGATIVE      Bilirubin,  Urine NEGATIVE      Urobilinogen, Urine <2.0      Nitrite, Urine NEGATIVE      Leukocyte Esterase, Urine TRACE (*)    MICROSCOPIC ONLY, URINE - Abnormal    WBC, Urine 1-5      RBC, Urine 11-20 (*)     Squamous Epithelial Cells, Urine 1-9 (SPARSE)     LIPASE - Normal    Lipase 33      Narrative:     Venipuncture immediately after or during the administration of Metamizole may lead to falsely low results. Testing should be performed immediately prior to Metamizole dosing.   HUMAN CHORIONIC GONADOTROPIN, SERUM QUANTITATIVE - Normal    HCG, Beta-Quantitative <2      Narrative:      Total HCG measurement is performed using the [employer]   Immunoassay which detects intact HCG and free beta HCG subunit.    This test is not indicated for use as a tumor marker.   HCG testing is performed using a different test methodology at [facility] than other [facility]. Direct result comparison   should only be made within the same method.       URINE CULTURE   URINALYSIS WITH REFLEX CULTURE AND MICROSCOPIC    Narrative:     The following orders were created for panel order Urinalysis with Reflex Culture and Microscopic.  Procedure                               Abnormality         Status                     ---------                               -----------         ------                     Urinalysis with Reflex C...[[id number]]  Abnormal            Final result               Extra Urine Gray Tube[[id number]]                            In process                   Please view results for these tests on the individual orders.   EXTRA URINE GRAY TUBE   GRAY TOP     ED Medication Administration from 05/29/2025 0417 to 05/29/2025 0635         Date/Time Order Dose Route Action Action by     05/29/2025 0430 EDT morphine injection 4 mg 4 mg intravenous Given CLARISSA Abrams     05/29/2025 0430 EDT sodium chloride 0.9 % bolus 1,000 mL 1,000 mL intravenous New Bag CLARISSA Abrams     05/29/2025 0431 EDT ondansetron  (Zofran) injection 4 mg 4 mg intravenous Given CLARISSA Abrams     05/29/2025 0536 EDT sodium chloride 0.9 % bolus 1,000 mL 0 mL intravenous Stopped CLARISSA Abrams     05/29/2025 0616 EDT iohexol (OMNIPaque) 350 mg iodine/mL solution 75 mL 75 mL intravenous Given OPAL Pedraza          CT abdomen pelvis w IV contrast   Final Result   1. Ill-defined 4.8 cm mass about the right pelvic sidewall likely   adnexal in origin. Ultrasound recommended for further evaluation.             Signed by: Manuel Benedict 5/29/2025 6:45 AM   Dictation workstation:   [id number]                    No data recorded     Darlene Coma Scale Score: 15 (05/29/25 0454 : Eufemia Abrams RN)                           Medical Decision Making  Patient presents emergency department the above history and physical.  Differential diagnosis includes appendicitis, renal bowel obstruction, ovarian cyst, ovarian torsion, pregnancy related complication, biliary colic, pancreatitis, acid peptic disease, gastroenteritis, dysmenorrhea, functional abdominal pain.    Patient treated with IV fluids, IV Zofran, IV morphine with good symptom improvement.  Laboratory evaluation includes negative pregnancy chemistry panel remarkable for chloride of 109 lipase is normal.  No peripheral leukocytosis.  Hemoglobin 10.2.  Urinalysis shows specific gravity of 1.004.  Large blood, negative nitrate, trace leukocyte esterase only 1-5 white cells and 11-20 red cells.  This may be on the basis of microscopic hematuria or contamination from the patient's menstrual bleeding.    CT scan of abdomen pelvis was obtained and read by radiology -- No bowel obstruction or bowel wall thickening, normal appendix, no aneurysmal dilatation of the abdominal aorta no hydronephrosis, bladder not distended, bile ducts nondilated, uterus unremarkable, 4.8 cm ill-defined mass on the right pelvic sidewall presumably related to adnexa ultrasound recommended.    Results presented to the patient and her significant  other who was present at the bedside.  Pelvic ultrasound has been ordered.      0800 --Case will be signed out to Dr Barnhart, the [facility] physician to follow-up on test results and make final disposition.    Procedure  Procedures         [1]  History reviewed. No pertinent past medical history.  [2]  History reviewed. No pertinent surgical history.  [3]  No family history on file.  [4]  Social History  Tobacco Use   • Smoking status: Never     Passive exposure: Never   • Smokeless tobacco: Never   Substance Use Topics   • Alcohol use: Not Currently     Comment: Social but its been a while   • Drug use: Never      Anna Barajas MD  05/29/25 0765

## 2025-05-30 LAB — BACTERIA UR CULT: ABNORMAL

## 2025-05-31 ENCOUNTER — TELEPHONE (OUTPATIENT)
Dept: PHARMACY | Facility: HOSPITAL | Age: 35
End: 2025-05-31
Payer: COMMERCIAL

## 2025-05-31 NOTE — PROGRESS NOTES
EDPD Note: Rapid Result Review    I reviewed Nighat Lam 's chart regarding a positive urine culture/result that was taken during their recent emergency room visit. The patient was not told about these results prior to leaving the emergency department. Therefore, patient was contacted and given appropriate education.     Patient presented to the ED 5/29 with symptoms of abdominal pain, found to have a hemorrhagic right ovarian cyst. No noted urinary symptoms. Discharged without antibiotics. Resulted positive for GBS (<10k) in urine. At the time of this call, patient endorses no urinary symptoms. Treatment is not indicated in asymptomatic bacteriuria.     Susceptibility data from last 90 days.  Collected Specimen Info Organism   05/29/25 Urine from Clean Catch/Voided Streptococcus agalactiae (Group B Streptococcus)     Admission on 05/29/2025, Discharged on 05/29/2025   Component Date Value Ref Range Status    WBC 05/29/2025 7.1  4.4 - 11.3 x10*3/uL Final    nRBC 05/29/2025 0.0  0.0 - 0.0 /100 WBCs Final    RBC 05/29/2025 4.07  4.00 - 5.20 x10*6/uL Final    Hemoglobin 05/29/2025 10.2 (L)  12.0 - 16.0 g/dL Final    Hematocrit 05/29/2025 32.4 (L)  36.0 - 46.0 % Final    MCV 05/29/2025 80  80 - 100 fL Final    MCH 05/29/2025 25.1 (L)  26.0 - 34.0 pg Final    MCHC 05/29/2025 31.5 (L)  32.0 - 36.0 g/dL Final    RDW 05/29/2025 15.9 (H)  11.5 - 14.5 % Final    Platelets 05/29/2025 260  150 - 450 x10*3/uL Final    Neutrophils % 05/29/2025 44.4  40.0 - 80.0 % Final    Immature Granulocytes %, Automated 05/29/2025 0.1  0.0 - 0.9 % Final    Immature Granulocyte Count (IG) includes promyelocytes, myelocytes and metamyelocytes but does not include bands. Percent differential counts (%) should be interpreted in the context of the absolute cell counts (cells/UL).    Lymphocytes % 05/29/2025 42.9  13.0 - 44.0 % Final    Monocytes % 05/29/2025 8.7  2.0 - 10.0 % Final    Eosinophils % 05/29/2025 3.5  0.0 - 6.0 % Final     Basophils % 05/29/2025 0.4  0.0 - 2.0 % Final    Neutrophils Absolute 05/29/2025 3.14  1.20 - 7.70 x10*3/uL Final    Percent differential counts (%) should be interpreted in the context of the absolute cell counts (cells/uL).    Immature Granulocytes Absolute, Au* 05/29/2025 0.01  0.00 - 0.70 x10*3/uL Final    Lymphocytes Absolute 05/29/2025 3.04  1.20 - 4.80 x10*3/uL Final    Monocytes Absolute 05/29/2025 0.62  0.10 - 1.00 x10*3/uL Final    Eosinophils Absolute 05/29/2025 0.25  0.00 - 0.70 x10*3/uL Final    Basophils Absolute 05/29/2025 0.03  0.00 - 0.10 x10*3/uL Final    Glucose 05/29/2025 93  74 - 99 mg/dL Final    Sodium 05/29/2025 138  136 - 145 mmol/L Final    Potassium 05/29/2025 3.8  3.5 - 5.3 mmol/L Final    Chloride 05/29/2025 109 (H)  98 - 107 mmol/L Final    Bicarbonate 05/29/2025 24  21 - 32 mmol/L Final    Anion Gap 05/29/2025 9 (L)  10 - 20 mmol/L Final    Urea Nitrogen 05/29/2025 9  6 - 23 mg/dL Final    Creatinine 05/29/2025 0.55  0.50 - 1.05 mg/dL Final    eGFR 05/29/2025 >90  >60 mL/min/1.73m*2 Final    Calculations of estimated GFR are performed using the 2021 CKD-EPI Study Refit equation without the race variable for the IDMS-Traceable creatinine methods.  https://jasn.asnjournals.org/content/early/2021/09/22/ASN.5625641337    Calcium 05/29/2025 8.6  8.6 - 10.3 mg/dL Final    Albumin 05/29/2025 4.2  3.4 - 5.0 g/dL Final    Alkaline Phosphatase 05/29/2025 45  33 - 110 U/L Final    Total Protein 05/29/2025 6.7  6.4 - 8.2 g/dL Final    AST 05/29/2025 14  9 - 39 U/L Final    Bilirubin, Total 05/29/2025 0.2  0.0 - 1.2 mg/dL Final    ALT 05/29/2025 14  7 - 45 U/L Final    Patients treated with Sulfasalazine may generate falsely decreased results for ALT.    Lipase 05/29/2025 33  9 - 82 U/L Final    HCG, Beta-Quantitative 05/29/2025 <2  <5 mIU/mL Final    Color, Urine 05/29/2025 Straw  Straw, Yellow Final    Appearance, Urine 05/29/2025 Clear  Clear Final    Specific Gravity, Urine 05/29/2025 1.004 (N)   1.005 - 1.035 Final    pH, Urine 05/29/2025 6.0  5.0, 5.5, 6.0, 6.5, 7.0, 7.5, 8.0 Final    Protein, Urine 05/29/2025 NEGATIVE  NEGATIVE mg/dL Final    Glucose, Urine 05/29/2025 NEGATIVE  NEGATIVE mg/dL Final    Blood, Urine 05/29/2025 LARGE (3+) (A)  NEGATIVE mg/dL Final    Ketones, Urine 05/29/2025 NEGATIVE  NEGATIVE mg/dL Final    Bilirubin, Urine 05/29/2025 NEGATIVE  NEGATIVE mg/dL Final    Urobilinogen, Urine 05/29/2025 <2.0  <2.0 mg/dL Final    Nitrite, Urine 05/29/2025 NEGATIVE  NEGATIVE Final    Leukocyte Esterase, Urine 05/29/2025 TRACE (A)  NEGATIVE Final    Extra Tube 05/29/2025 Hold for add-ons.   Final    Auto resulted.    Extra Tube 05/29/2025 293   Final    Extra Tube 05/29/2025 Hold for add-ons.   Final    Auto resulted.    Extra Tube 05/29/2025 293   Final    WBC, Urine 05/29/2025 1-5  1-5, NONE /HPF Final    RBC, Urine 05/29/2025 11-20 (A)  NONE, 1-2, 3-5 /HPF Final    Squamous Epithelial Cells, Urine 05/29/2025 1-9 (SPARSE)  Reference range not established. /HPF Final    Urine Culture 05/29/2025 <=10,000 CFU/mL Streptococcus agalactiae (Group B Streptococcus) (A)   Final    Comment: Streptococcus agalactiae (Group B Streptococcus, GBS) may be significant in pregnant individuals. Recovery from non-pregnant individuals likely represents an insignificant finding. Routine GBS prenatal screening should be ordered using test “Group B                            Streptococcus (GBS) Prenatal Screen, Culture” ([id number]).       No further follow up needed from EDPD Team.     If there are any other questions for the ED Post-Discharge Culture Follow Up Team, please contact [phone]. Fax: [phone].    Nic Rod, Jo AnnD